# Patient Record
Sex: FEMALE | Race: WHITE | NOT HISPANIC OR LATINO | URBAN - METROPOLITAN AREA
[De-identification: names, ages, dates, MRNs, and addresses within clinical notes are randomized per-mention and may not be internally consistent; named-entity substitution may affect disease eponyms.]

---

## 2023-03-14 LAB
ALANINE AMINOTRANSFERASE (SGPT) (U/L) IN SER/PLAS: 17 U/L (ref 7–45)
ALBUMIN (G/DL) IN SER/PLAS: 4.2 G/DL (ref 3.4–5)
ALKALINE PHOSPHATASE (U/L) IN SER/PLAS: 56 U/L (ref 33–110)
AMPHETAMINE (PRESENCE) IN URINE BY SCREEN METHOD: ABNORMAL
ANION GAP IN SER/PLAS: 11 MMOL/L (ref 10–20)
APPEARANCE, URINE: NORMAL
ASPARTATE AMINOTRANSFERASE (SGOT) (U/L) IN SER/PLAS: 18 U/L (ref 9–39)
BARBITURATES PRESENCE IN URINE BY SCREEN METHOD: ABNORMAL
BASOPHILS (10*3/UL) IN BLOOD BY AUTOMATED COUNT: 0.07 X10E9/L (ref 0–0.1)
BASOPHILS/100 LEUKOCYTES IN BLOOD BY AUTOMATED COUNT: 1.2 % (ref 0–2)
BENZODIAZEPINE (PRESENCE) IN URINE BY SCREEN METHOD: ABNORMAL
BILIRUBIN TOTAL (MG/DL) IN SER/PLAS: 0.3 MG/DL (ref 0–1.2)
BILIRUBIN, URINE: NEGATIVE
BLOOD, URINE: NEGATIVE
CALCIUM (MG/DL) IN SER/PLAS: 9.3 MG/DL (ref 8.6–10.3)
CANNABINOIDS IN URINE BY SCREEN METHOD: ABNORMAL
CARBON DIOXIDE, TOTAL (MMOL/L) IN SER/PLAS: 28 MMOL/L (ref 21–32)
CHLORIDE (MMOL/L) IN SER/PLAS: 102 MMOL/L (ref 98–107)
CHORIOGONADOTROPIN (MIU/ML) IN SER/PLAS: <2 MIU/ML
COCAINE (PRESENCE) IN URINE BY SCREEN METHOD: ABNORMAL
COLOR, URINE: YELLOW
CREATININE (MG/DL) IN SER/PLAS: 0.76 MG/DL (ref 0.5–1.05)
DRUG SCREEN COMMENT URINE: ABNORMAL
EOSINOPHILS (10*3/UL) IN BLOOD BY AUTOMATED COUNT: 0.18 X10E9/L (ref 0–0.7)
EOSINOPHILS/100 LEUKOCYTES IN BLOOD BY AUTOMATED COUNT: 3 % (ref 0–6)
ERYTHROCYTE DISTRIBUTION WIDTH (RATIO) BY AUTOMATED COUNT: 13.4 % (ref 11.5–14.5)
ERYTHROCYTE MEAN CORPUSCULAR HEMOGLOBIN CONCENTRATION (G/DL) BY AUTOMATED: 31.9 G/DL (ref 32–36)
ERYTHROCYTE MEAN CORPUSCULAR VOLUME (FL) BY AUTOMATED COUNT: 97 FL (ref 80–100)
ERYTHROCYTES (10*6/UL) IN BLOOD BY AUTOMATED COUNT: 3.97 X10E12/L (ref 4–5.2)
FENTANYL URINE: ABNORMAL
GFR FEMALE: >90 ML/MIN/1.73M2
GLUCOSE (MG/DL) IN SER/PLAS: 91 MG/DL (ref 74–99)
GLUCOSE, URINE: NEGATIVE MG/DL
HEMATOCRIT (%) IN BLOOD BY AUTOMATED COUNT: 38.6 % (ref 36–46)
HEMOGLOBIN (G/DL) IN BLOOD: 12.3 G/DL (ref 12–16)
IMMATURE GRANULOCYTES/100 LEUKOCYTES IN BLOOD BY AUTOMATED COUNT: 0.2 % (ref 0–0.9)
KETONES, URINE: NEGATIVE MG/DL
LEUKOCYTE ESTERASE, URINE: NEGATIVE
LEUKOCYTES (10*3/UL) IN BLOOD BY AUTOMATED COUNT: 5.9 X10E9/L (ref 4.4–11.3)
LYMPHOCYTES (10*3/UL) IN BLOOD BY AUTOMATED COUNT: 1.96 X10E9/L (ref 1.2–4.8)
LYMPHOCYTES/100 LEUKOCYTES IN BLOOD BY AUTOMATED COUNT: 33 % (ref 13–44)
METHADONE (PRESENCE) IN URINE BY SCREEN METHOD: ABNORMAL
MONOCYTES (10*3/UL) IN BLOOD BY AUTOMATED COUNT: 0.47 X10E9/L (ref 0.1–1)
MONOCYTES/100 LEUKOCYTES IN BLOOD BY AUTOMATED COUNT: 7.9 % (ref 2–10)
NEUTROPHILS (10*3/UL) IN BLOOD BY AUTOMATED COUNT: 3.25 X10E9/L (ref 1.2–7.7)
NEUTROPHILS/100 LEUKOCYTES IN BLOOD BY AUTOMATED COUNT: 54.7 % (ref 40–80)
NITRITE, URINE: NEGATIVE
OPIATES (PRESENCE) IN URINE BY SCREEN METHOD: ABNORMAL
OXYCODONE (PRESENCE) IN URINE BY SCREEN METHOD: ABNORMAL
PH, URINE: 6 (ref 5–8)
PHENCYCLIDINE (PRESENCE) IN URINE BY SCREEN METHOD: ABNORMAL
PLATELETS (10*3/UL) IN BLOOD AUTOMATED COUNT: 436 X10E9/L (ref 150–450)
POTASSIUM (MMOL/L) IN SER/PLAS: 5.5 MMOL/L (ref 3.5–5.3)
PROTEIN TOTAL: 6.7 G/DL (ref 6.4–8.2)
PROTEIN, URINE: NEGATIVE MG/DL
SODIUM (MMOL/L) IN SER/PLAS: 135 MMOL/L (ref 136–145)
SPECIFIC GRAVITY, URINE: 1.02 (ref 1–1.03)
THYROTROPIN (MIU/L) IN SER/PLAS BY DETECTION LIMIT <= 0.05 MIU/L: 2.04 MIU/L (ref 0.44–3.98)
TROPONIN I, HIGH SENSITIVITY: 6 NG/L (ref 0–13)
UREA NITROGEN (MG/DL) IN SER/PLAS: 18 MG/DL (ref 6–23)
UROBILINOGEN, URINE: <2 MG/DL (ref 0–1.9)

## 2023-03-18 LAB
AMPHETAMINES,URINE: >5000 NG/ML
MDA,URINE: <200 NG/ML
MDEA,URINE: <200 NG/ML
MDMA,UR: <200 NG/ML
METHAMPHETAMINE QUANTITATIVE URINE: <200 NG/ML
PHENTERMINE,UR: <200 NG/ML

## 2024-06-13 ENCOUNTER — APPOINTMENT (OUTPATIENT)
Dept: PRIMARY CARE | Facility: CLINIC | Age: 49
End: 2024-06-13

## 2024-06-13 VITALS
HEIGHT: 61 IN | BODY MASS INDEX: 16.99 KG/M2 | HEART RATE: 102 BPM | OXYGEN SATURATION: 97 % | WEIGHT: 90 LBS | DIASTOLIC BLOOD PRESSURE: 72 MMHG | SYSTOLIC BLOOD PRESSURE: 126 MMHG

## 2024-06-13 DIAGNOSIS — R91.8 ABNORMAL CT SCAN OF LUNG: ICD-10-CM

## 2024-06-13 DIAGNOSIS — R91.1 PULMONARY NODULE: ICD-10-CM

## 2024-06-13 DIAGNOSIS — M48.02 CERVICAL SPINAL STENOSIS: ICD-10-CM

## 2024-06-13 DIAGNOSIS — J43.9 PULMONARY EMPHYSEMA, UNSPECIFIED EMPHYSEMA TYPE (MULTI): ICD-10-CM

## 2024-06-13 DIAGNOSIS — R13.10 DYSPHAGIA, UNSPECIFIED TYPE: Primary | ICD-10-CM

## 2024-06-13 DIAGNOSIS — R62.7 FAILURE TO THRIVE IN ADULT: ICD-10-CM

## 2024-06-13 PROCEDURE — 1036F TOBACCO NON-USER: CPT | Performed by: FAMILY MEDICINE

## 2024-06-13 PROCEDURE — 99212 OFFICE O/P EST SF 10 MIN: CPT | Performed by: FAMILY MEDICINE

## 2024-06-13 RX ORDER — PANTOPRAZOLE SODIUM 40 MG/1
40 TABLET, DELAYED RELEASE ORAL DAILY
Qty: 30 TABLET | Refills: 5 | Status: SHIPPED | OUTPATIENT
Start: 2024-06-13 | End: 2024-12-10

## 2024-06-13 ASSESSMENT — ENCOUNTER SYMPTOMS
ORTHOPNEA: 1
PND: 1
SHORTNESS OF BREATH: 1
HYPERTENSION: 1

## 2024-06-13 NOTE — PATIENT INSTRUCTIONS
GO TO DR LEW FOR ENT  GO TO DR QUIROZ FOR GI   CALL FOR SOCIAL WORK ASSISTANCE.    CALL FOR NEEDS [phone].    USE MYCHART   1--NEEDS LISW  2--NEEDS ENT  3--OPTIONS  OPEN-M; [employer]SI OTHER.    4--OPEN ENROLLMENT AT WORK:  CALL HR.  PT WORKS IN HR.    5--OMEPRAZOLE MADE REFLUX WORSE FOR PT.  PLEASE RETRY PANTOPRAZOLE.    6--SLEEP ON YOUR LEFT SIDE AND WITH THE HEAD OF THE BED ELEVATED.    7--GET HEALTH INSURANCE.    8--QUIT VAPING.  ALL OF IT.    9--call  sharad rahman for assistance:  [phone]

## 2024-06-13 NOTE — PROGRESS NOTES
Subjective   Patient ID: María Stapleton is a 48 y.o. female who presents for Follow-up.  Hypertension  This is a chronic problem. The current episode started more than 1 year ago. The problem has been waxing and waning since onset. The problem is uncontrolled. Associated symptoms include orthopnea, PND and shortness of breath. There are no associated agents to hypertension. Risk factors for coronary artery disease include family history, smoking/tobacco exposure and stress. There are no compliance problems.      ALL RELATED TO MY THROAT PROBLEMS AND IN [facility] ER:   10 MAY 2024.  1 MONTH AGO.    Discharge Summaries  - documented in this encounter  Trinity Lee MD - 05/12/2024 2:21 PM EDT  Formatting of this note is different from the original.  Images from the original note were not included.    DISCHARGE SUMMARY    PATIENT NAME: María Vera ADMISSION DATE: 5/10/2024  MRN: [id number] DISCHARGE DATE: 5/12/2024    ATTENDING PHYSICIAN: Ahsan Santiago, * Code Status: Full Code    Highest Readmission Risk Score: 11  The 30 day readmissions risk score is derived from an internally validated risk model which evaluates patient level characteristics, utilization history, medication orders and lab results up until the day of discharge. Patients with a score of 40 or above are considered highest risk for readmission. Specific patient level drivers will be listed at the bottom of the summary.      REASON FOR HOSPITALIZATION: Laryngeal edema  DIAGNOSIS: Laryngeal edema  OPERATIONS DURING HOSPITALIZATION: None   PROCEDURE:  LARYNGOSCOPY.   BARIUM SWALLOW NOT DONE 2/2 FINANCES.        ADMITTED FOR ELEVATED BP.  DX A MASS   THEN ENT SAID HE DID NOT SEE A MASS:  VOICE CHANGES AND NEEDS BIOPSY.    DIFFICULTY EATING AND CHOKING.      RASPING HOARSE VOICE AND PHLEGMY COUGH.      I speka bluntly with pt that she may have a treatable cancer that is killing her and waiting for financial aide to call her will not excuse  "her from dying.      1--NEEDS LISW  2--NEEDS ENT  3--OPTIONS [facility] OPEN M SSI OTHER.    4--OPEN ENROLLMENT AT WORK:  CALL HR.  PT WORKS IN HR.    5--OMEPRAZOLE MADE REFLUX WORSE FOR PT.  PLEASE RETRY PANTOPRAZOLE.        Review of Systems   Respiratory:  Positive for shortness of breath.    Cardiovascular:  Positive for orthopnea and PND.       Objective   Physical Exam  Vitals and nursing note reviewed.   Constitutional:       Comments: Very thin tan wf nontoxic yuet coughs asnd clearing throat often no facial assymmetry.  Heent thin neck and supple no masses no goiter.  End exp wh and elevated hr after our discussion of her case.  Scaphoid abd neg leg raise no cvat.  Clear freckled skin.  No nicotine staining of fingers.  Neuro intact.  Sl anxious mood.           Assessment/Plan   Diagnoses and all orders for this visit:  Dysphagia, unspecified type  -     Referral to Social Work; Future  -     Referral to ENT; Future  -     Referral to Gastroenterology; Future  -     Follow Up In Primary Care - Established; Future  -     fluticasone-umeclidin-vilanter (TRELEGY-ELLIPTA) 100-62.5-25 mcg 100-62.5-25 mcg/puff inhaler 1 puff  -     pantoprazole (ProtoNix) 40 mg EC tablet; Take 1 tablet (40 mg) by mouth once daily. Do not crush, chew, or split.  Pulmonary emphysema, unspecified emphysema type (Multi)  -     Referral to Social Work; Future  -     Referral to ENT; Future  -     Referral to Gastroenterology; Future  -     fluticasone-umeclidin-vilanter (TRELEGY-ELLIPTA) 100-62.5-25 mcg 100-62.5-25 mcg/puff inhaler 1 puff  Failure to thrive in adult  -     Referral to Social Work; Future  -     Referral to ENT; Future  -     Referral to Gastroenterology; Future  Cervical spinal stenosis  Abnormal CT scan of lung  Pulmonary nodule             6/13/24:  9 pm:  I completed review of [facility] notes in Epic and dr torre's notes:    \"Impression/Recommendations   Dysphagia, laryngeal edema on CT scan, currently no shortness of breath, " "mild raspy voice, no sign of mass of the larynx or pharynx on CT or my flexible laryngoscopy, see below (exam compromised due to gagging). I will order speech therapy evaluation. Because of the flexible laryngoscopy showed minimal edema and her breathing symptoms appear minimal, once her blood pressure is under control, she may be discharged from ENT standpoint and follow-up with me as an outpatient. I counseled the patient and her family about this. They indicated understanding and agreement. I counseled the patient's nurse about this.    Greater than 50% of this 55 minute visit was spent in counseling and coordination of care, the time for any procedures performed was not included in this number.    Procedure: Flexible laryngoscopy  Pre-/post diagnosis: Laryngeal edema  Description: Topical local anesthetic was applied through the left nares. The flexible laryngoscope was passed. The patient had significant gagging during the exam which compromised the exam. However, no laryngeal or pharyngeal mass was identified. There was very mild laryngeal erythema and minimal edema. There was normal TVC motion. The findings were reviewed with the patient.    Created in part using voice recognition software, some errors may have occurred. Corrections may be performed at a later date.    SIGNATURE: Philip Weems MD PATIENT NAME: María Vera   DATE: May 11, 2024 MRN: [id number]   TIME: 1:51 PM Phone: [phone] \"     1--ct chest recheck 4 mm rml nodule and ground glass appearance of lungs 5/12/24.  2--LISW NO INSURANCE.    3--SPEECH THERAPY COMPLETED PARTIAL SWALLOW STUDY,    MODIFIED BARIUM SWALLOW WAS ORDERED TO COMPLETE LARYNGEAL DYSPHAGIA WORKUP.   4--I AM REFERRING PT TO  ENT DR LEW AND  GI DR QUIROZ FOR UGI.   5--ABNL SUPRAGLOTTIC NARROWING ON CT AND C SPINE NARROWING ALSO NOTED.  ? NERVE IMPINGEMENT CONTRIBUTING TO DYSPHAGIA?   6--NEOPLASM IN SMOKER?  7--TRELEGY GIVEN SAMPLES FOR EFFECT IN " EMPHYSEMA  8--NO ABX, NO NEW LABS NO STEROIDS.   9--hypertensive urgency in er: HTN INCREASED AMLODIPINE TO 10 MG every day  10--ppi ppx restarted.   11--ethmoid and other sinusitis occlusion on ct head noted no abx.

## 2024-06-14 ENCOUNTER — TELEPHONE (OUTPATIENT)
Dept: PRIMARY CARE | Facility: CLINIC | Age: 49
End: 2024-06-14

## 2024-06-27 DIAGNOSIS — R13.10 DYSPHAGIA, UNSPECIFIED TYPE: Primary | ICD-10-CM

## 2024-06-27 DIAGNOSIS — R62.7 FAILURE TO THRIVE IN ADULT: ICD-10-CM

## 2024-06-27 DIAGNOSIS — R13.12 OROPHARYNGEAL DYSPHAGIA: ICD-10-CM

## 2024-06-27 DIAGNOSIS — J43.9 PULMONARY EMPHYSEMA, UNSPECIFIED EMPHYSEMA TYPE (MULTI): ICD-10-CM

## 2024-06-27 RX ORDER — PREDNISONE 10 MG/1
TABLET ORAL DAILY
Qty: 30 TABLET | Refills: 0 | Status: SHIPPED | OUTPATIENT
Start: 2024-06-27 | End: 2024-07-07

## 2024-06-27 NOTE — PROGRESS NOTES
Subjective   Patient ID: María Stapleton is a 48 y.o. female who presents for No chief complaint on file..  HPI    Review of Systems    Objective   Physical Exam    Assessment/Plan              .VS

## 2024-07-03 DIAGNOSIS — R13.10 DYSPHAGIA, UNSPECIFIED TYPE: ICD-10-CM

## 2024-07-03 DIAGNOSIS — R62.7 FAILURE TO THRIVE IN ADULT: ICD-10-CM

## 2024-07-18 ENCOUNTER — APPOINTMENT (OUTPATIENT)
Dept: PRIMARY CARE | Facility: CLINIC | Age: 49
End: 2024-07-18

## 2024-07-25 ENCOUNTER — APPOINTMENT (OUTPATIENT)
Dept: PRIMARY CARE | Facility: CLINIC | Age: 49
End: 2024-07-25

## 2024-08-08 ENCOUNTER — PATIENT MESSAGE (OUTPATIENT)
Dept: PRIMARY CARE | Facility: CLINIC | Age: 49
End: 2024-08-08

## 2024-08-08 DIAGNOSIS — I10 HYPERTENSION, UNSPECIFIED TYPE: Primary | ICD-10-CM

## 2024-08-08 RX ORDER — AMLODIPINE BESYLATE 10 MG/1
10 TABLET ORAL DAILY
COMMUNITY
Start: 2024-05-12 | End: 2024-08-08 | Stop reason: SDUPTHER

## 2024-08-08 RX ORDER — AMLODIPINE BESYLATE 10 MG/1
10 TABLET ORAL DAILY
Qty: 90 TABLET | Refills: 3 | Status: SHIPPED | OUTPATIENT
Start: 2024-08-08 | End: 2025-08-08

## 2024-08-13 ENCOUNTER — APPOINTMENT (OUTPATIENT)
Dept: PRIMARY CARE | Facility: CLINIC | Age: 49
End: 2024-08-13

## 2024-08-26 ENCOUNTER — APPOINTMENT (OUTPATIENT)
Dept: PRIMARY CARE | Facility: CLINIC | Age: 49
End: 2024-08-26

## 2024-09-13 ENCOUNTER — APPOINTMENT (OUTPATIENT)
Dept: PRIMARY CARE | Facility: CLINIC | Age: 49
End: 2024-09-13

## 2024-10-11 ENCOUNTER — APPOINTMENT (OUTPATIENT)
Dept: PRIMARY CARE | Facility: CLINIC | Age: 49
End: 2024-10-11

## 2024-10-21 ENCOUNTER — APPOINTMENT (OUTPATIENT)
Dept: GASTROENTEROLOGY | Facility: CLINIC | Age: 49
End: 2024-10-21

## 2024-10-24 ENCOUNTER — APPOINTMENT (OUTPATIENT)
Dept: PRIMARY CARE | Facility: CLINIC | Age: 49
End: 2024-10-24

## 2025-08-28 DIAGNOSIS — I10 HYPERTENSION, UNSPECIFIED TYPE: ICD-10-CM

## 2025-08-28 RX ORDER — AMLODIPINE BESYLATE 10 MG/1
10 TABLET ORAL DAILY
Qty: 90 TABLET | Refills: 0 | Status: SHIPPED | OUTPATIENT
Start: 2025-08-28